# Patient Record
Sex: FEMALE | Race: WHITE
[De-identification: names, ages, dates, MRNs, and addresses within clinical notes are randomized per-mention and may not be internally consistent; named-entity substitution may affect disease eponyms.]

---

## 2020-10-30 ENCOUNTER — HOSPITAL ENCOUNTER (EMERGENCY)
Dept: HOSPITAL 94 - ER | Age: 20
Discharge: HOME | End: 2020-10-30
Payer: MEDICAID

## 2020-10-30 VITALS — WEIGHT: 178.38 LBS | BODY MASS INDEX: 31.61 KG/M2 | HEIGHT: 63 IN

## 2020-10-30 VITALS — DIASTOLIC BLOOD PRESSURE: 87 MMHG | SYSTOLIC BLOOD PRESSURE: 122 MMHG

## 2020-10-30 DIAGNOSIS — K21.9: ICD-10-CM

## 2020-10-30 DIAGNOSIS — L02.611: Primary | ICD-10-CM

## 2020-10-30 DIAGNOSIS — M79.671: ICD-10-CM

## 2020-10-30 DIAGNOSIS — Z88.0: ICD-10-CM

## 2020-10-30 DIAGNOSIS — F12.90: ICD-10-CM

## 2020-10-30 DIAGNOSIS — F17.200: ICD-10-CM

## 2020-10-30 PROCEDURE — 76882 US LMTD JT/FCL EVL NVASC XTR: CPT

## 2020-10-30 PROCEDURE — 10160 PNXR ASPIR ABSC HMTMA BULLA: CPT

## 2020-10-30 PROCEDURE — 10060 I&D ABSCESS SIMPLE/SINGLE: CPT

## 2020-10-30 PROCEDURE — 73610 X-RAY EXAM OF ANKLE: CPT

## 2020-10-30 PROCEDURE — 99284 EMERGENCY DEPT VISIT MOD MDM: CPT

## 2020-12-06 ENCOUNTER — HOSPITAL ENCOUNTER (EMERGENCY)
Dept: HOSPITAL 94 - ER | Age: 20
Discharge: HOME | End: 2020-12-06
Payer: MEDICAID

## 2020-12-06 VITALS — DIASTOLIC BLOOD PRESSURE: 78 MMHG | SYSTOLIC BLOOD PRESSURE: 132 MMHG

## 2020-12-06 VITALS — WEIGHT: 175.38 LBS | HEIGHT: 62 IN | BODY MASS INDEX: 32.27 KG/M2

## 2020-12-06 DIAGNOSIS — Z11.3: Primary | ICD-10-CM

## 2020-12-06 DIAGNOSIS — K21.9: ICD-10-CM

## 2020-12-06 DIAGNOSIS — F12.10: ICD-10-CM

## 2020-12-06 DIAGNOSIS — Z88.0: ICD-10-CM

## 2020-12-06 PROCEDURE — 99281 EMR DPT VST MAYX REQ PHY/QHP: CPT

## 2021-02-16 ENCOUNTER — HOSPITAL ENCOUNTER (EMERGENCY)
Dept: HOSPITAL 94 - ER | Age: 21
Discharge: HOME | End: 2021-02-16
Payer: MEDICAID

## 2021-02-16 VITALS — HEIGHT: 63 IN | BODY MASS INDEX: 31.07 KG/M2 | WEIGHT: 175.38 LBS

## 2021-02-16 VITALS — DIASTOLIC BLOOD PRESSURE: 56 MMHG | SYSTOLIC BLOOD PRESSURE: 148 MMHG

## 2021-02-16 DIAGNOSIS — Z90.49: ICD-10-CM

## 2021-02-16 DIAGNOSIS — Z88.0: ICD-10-CM

## 2021-02-16 DIAGNOSIS — F12.90: ICD-10-CM

## 2021-02-16 DIAGNOSIS — N39.0: ICD-10-CM

## 2021-02-16 DIAGNOSIS — N93.9: Primary | ICD-10-CM

## 2021-02-16 DIAGNOSIS — Z79.2: ICD-10-CM

## 2021-02-16 DIAGNOSIS — K21.9: ICD-10-CM

## 2021-02-16 DIAGNOSIS — Z79.899: ICD-10-CM

## 2021-02-16 LAB
ALBUMIN SERPL BCP-MCNC: 3.9 G/DL (ref 3.4–5)
ALBUMIN/GLOB SERPL: 1.1 {RATIO} (ref 1.1–1.5)
ALP SERPL-CCNC: 49 IU/L (ref 46–116)
ALT SERPL W P-5'-P-CCNC: 16 U/L (ref 12–78)
ANION GAP SERPL CALCULATED.3IONS-SCNC: 7 MMOL/L (ref 8–16)
AST SERPL W P-5'-P-CCNC: 4 U/L (ref 10–37)
BACTERIA URNS QL MICRO: (no result) /HPF
BASOPHILS # BLD AUTO: 0 X10'3 (ref 0–0.2)
BASOPHILS NFR BLD AUTO: 0.5 % (ref 0–1)
BILIRUB SERPL-MCNC: 0.2 MG/DL (ref 0.1–1)
BUN SERPL-MCNC: 14 MG/DL (ref 7–18)
BUN/CREAT SERPL: 19.7 (ref 6.6–38)
CALCIUM SERPL-MCNC: 9 MG/DL (ref 8.5–10.1)
CAOX CRY URNS QL MICRO: (no result) /HPF
CHLORIDE SERPL-SCNC: 105 MMOL/L (ref 99–107)
CLARITY UR: CLEAR
CO2 SERPL-SCNC: 28.6 MMOL/L (ref 24–32)
COLOR UR: YELLOW
CREAT SERPL-MCNC: 0.71 MG/DL (ref 0.4–0.9)
DEPRECATED SQUAMOUS URNS QL MICRO: (no result) /LPF
EOSINOPHIL # BLD AUTO: 0.1 X10'3 (ref 0–0.9)
EOSINOPHIL NFR BLD AUTO: 2.1 % (ref 0–6)
ERYTHROCYTE [DISTWIDTH] IN BLOOD BY AUTOMATED COUNT: 15.3 % (ref 11.5–14.5)
GFR SERPL CREATININE-BSD FRML MDRD: > 90 ML/MIN
GLUCOSE SERPL-MCNC: 103 MG/DL (ref 70–104)
GLUCOSE UR STRIP-MCNC: NEGATIVE MG/DL
HCG UR QL: NEGATIVE
HCT VFR BLD AUTO: 34.7 % (ref 35–45)
HGB BLD-MCNC: 11.3 G/DL (ref 12–16)
HGB UR QL STRIP: (no result)
KETONES UR STRIP-MCNC: NEGATIVE MG/DL
LEUKOCYTE ESTERASE UR QL STRIP: (no result)
LYMPHOCYTES # BLD AUTO: 1.1 X10'3 (ref 1.1–4.8)
LYMPHOCYTES NFR BLD AUTO: 21.3 % (ref 21–51)
MCH RBC QN AUTO: 27 PG (ref 27–31)
MCHC RBC AUTO-ENTMCNC: 32.7 G/DL (ref 33–36.5)
MCV RBC AUTO: 82.5 FL (ref 78–98)
MONOCYTES # BLD AUTO: 0.5 X10'3 (ref 0–0.9)
MONOCYTES NFR BLD AUTO: 10.1 % (ref 2–12)
NEUTROPHILS # BLD AUTO: 3.4 X10'3 (ref 1.8–7.7)
NEUTROPHILS NFR BLD AUTO: 66 % (ref 42–75)
NITRITE UR QL STRIP: NEGATIVE
PH UR STRIP: 5.5 [PH] (ref 4.8–8)
PLATELET # BLD AUTO: 276 X10'3 (ref 140–440)
PMV BLD AUTO: 7 FL (ref 7.4–10.4)
POTASSIUM SERPL-SCNC: 3.8 MMOL/L (ref 3.5–5.1)
PROT SERPL-MCNC: 7.3 G/DL (ref 6.4–8.2)
PROT UR QL STRIP: (no result) MG/DL
RBC # BLD AUTO: 4.21 X10'6 (ref 4.2–5.6)
RBC #/AREA URNS HPF: (no result) /HPF (ref 0–2)
SODIUM SERPL-SCNC: 141 MMOL/L (ref 135–145)
SP GR UR STRIP: >=1.03 (ref 1–1.03)
URN COLLECT METHOD CLASS: (no result)
UROBILINOGEN UR STRIP-MCNC: 0.2 E.U/DL (ref 0.2–1)
WBC # BLD AUTO: 5.2 X10'3 (ref 4.5–11)

## 2021-02-16 PROCEDURE — 87088 URINE BACTERIA CULTURE: CPT

## 2021-02-16 PROCEDURE — 81025 URINE PREGNANCY TEST: CPT

## 2021-02-16 PROCEDURE — 85025 COMPLETE CBC W/AUTO DIFF WBC: CPT

## 2021-02-16 PROCEDURE — 99283 EMERGENCY DEPT VISIT LOW MDM: CPT

## 2021-02-16 PROCEDURE — 81001 URINALYSIS AUTO W/SCOPE: CPT

## 2021-02-16 PROCEDURE — 36415 COLL VENOUS BLD VENIPUNCTURE: CPT

## 2021-02-16 PROCEDURE — 87077 CULTURE AEROBIC IDENTIFY: CPT

## 2021-02-16 PROCEDURE — 80053 COMPREHEN METABOLIC PANEL: CPT

## 2021-02-16 PROCEDURE — 87186 SC STD MICRODIL/AGAR DIL: CPT

## 2021-06-24 ENCOUNTER — HOSPITAL ENCOUNTER (EMERGENCY)
Dept: HOSPITAL 94 - ER | Age: 21
Discharge: LEFT BEFORE DISCHARGE | End: 2021-06-24
Payer: MEDICAID

## 2021-06-24 DIAGNOSIS — Z53.21: ICD-10-CM

## 2021-06-24 DIAGNOSIS — H00.019: Primary | ICD-10-CM

## 2022-01-10 ENCOUNTER — HOSPITAL ENCOUNTER (EMERGENCY)
Dept: HOSPITAL 94 - ER | Age: 22
LOS: 1 days | Discharge: LEFT BEFORE DISCHARGE | End: 2022-01-11
Payer: MEDICAID

## 2022-01-10 VITALS — WEIGHT: 170.35 LBS | BODY MASS INDEX: 32.16 KG/M2 | HEIGHT: 61 IN

## 2022-01-10 VITALS — DIASTOLIC BLOOD PRESSURE: 69 MMHG | SYSTOLIC BLOOD PRESSURE: 130 MMHG

## 2022-01-10 DIAGNOSIS — Z20.822: ICD-10-CM

## 2022-01-10 DIAGNOSIS — Z79.899: ICD-10-CM
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
## 2022-01-10 DIAGNOSIS — H11.422: ICD-10-CM

## 2022-01-10 DIAGNOSIS — T20.10XA: Primary | ICD-10-CM

## 2022-01-10 DIAGNOSIS — K21.9: ICD-10-CM

## 2022-01-10 DIAGNOSIS — Z88.0: ICD-10-CM

## 2022-01-10 DIAGNOSIS — T59.4X1A: ICD-10-CM

## 2022-01-10 DIAGNOSIS — Z90.49: ICD-10-CM

## 2022-01-10 PROCEDURE — 85025 COMPLETE CBC W/AUTO DIFF WBC: CPT

## 2022-01-10 PROCEDURE — 36415 COLL VENOUS BLD VENIPUNCTURE: CPT

## 2022-01-10 PROCEDURE — 80048 BASIC METABOLIC PNL TOTAL CA: CPT

## 2022-01-10 PROCEDURE — 87635 SARS-COV-2 COVID-19 AMP PRB: CPT

## 2022-01-10 PROCEDURE — 99283 EMERGENCY DEPT VISIT LOW MDM: CPT

## 2022-01-11 LAB
ALBUMIN SERPL BCP-MCNC: 3.8 G/DL (ref 3.4–5)
ANION GAP SERPL CALCULATED.3IONS-SCNC: 7 MMOL/L (ref 8–16)
BASOPHILS # BLD AUTO: 0 X10'3 (ref 0–0.2)
BASOPHILS NFR BLD AUTO: 0.5 % (ref 0–1)
BUN SERPL-MCNC: 8 MG/DL (ref 7–18)
BUN/CREAT SERPL: 13.8 (ref 6.6–38)
CALCIUM SERPL-MCNC: 9 MG/DL (ref 8.5–10.1)
CHLORIDE SERPL-SCNC: 102 MMOL/L (ref 99–107)
CO2 SERPL-SCNC: 28.6 MMOL/L (ref 24–32)
CREAT SERPL-MCNC: 0.58 MG/DL (ref 0.4–0.9)
EOSINOPHIL # BLD AUTO: 0 X10'3 (ref 0–0.9)
EOSINOPHIL NFR BLD AUTO: 0.5 % (ref 0–6)
ERYTHROCYTE [DISTWIDTH] IN BLOOD BY AUTOMATED COUNT: 12.9 % (ref 11.5–14.5)
GFR SERPL CREATININE-BSD FRML MDRD: > 90 ML/MIN
GLUCOSE SERPL-MCNC: 96 MG/DL (ref 70–104)
HCT VFR BLD AUTO: 38.2 % (ref 35–45)
HGB BLD-MCNC: 13.1 G/DL (ref 12–16)
LYMPHOCYTES # BLD AUTO: 1.7 X10'3 (ref 1.1–4.8)
LYMPHOCYTES NFR BLD AUTO: 18.6 % (ref 21–51)
MCH RBC QN AUTO: 29.7 PG (ref 27–31)
MCHC RBC AUTO-ENTMCNC: 34.2 G/DL (ref 33–36.5)
MCV RBC AUTO: 86.9 FL (ref 78–98)
MONOCYTES # BLD AUTO: 0.5 X10'3 (ref 0–0.9)
MONOCYTES NFR BLD AUTO: 5.7 % (ref 2–12)
NEUTROPHILS # BLD AUTO: 6.7 X10'3 (ref 1.8–7.7)
NEUTROPHILS NFR BLD AUTO: 74.7 % (ref 42–75)
PLATELET # BLD AUTO: 260 X10'3 (ref 140–440)
PMV BLD AUTO: 7.3 FL (ref 7.4–10.4)
POTASSIUM SERPL-SCNC: 4 MMOL/L (ref 3.5–5.1)
RBC # BLD AUTO: 4.4 X10'6 (ref 4.2–5.6)
SODIUM SERPL-SCNC: 138 MMOL/L (ref 135–145)
WBC # BLD AUTO: 9 X10'3 (ref 4.5–11)
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
## 2022-02-21 ENCOUNTER — HOSPITAL ENCOUNTER (EMERGENCY)
Dept: HOSPITAL 94 - ER | Age: 22
Discharge: LEFT BEFORE DISCHARGE | End: 2022-02-21
Payer: MEDICAID

## 2022-02-21 VITALS — SYSTOLIC BLOOD PRESSURE: 118 MMHG | DIASTOLIC BLOOD PRESSURE: 73 MMHG

## 2022-02-21 VITALS — HEIGHT: 63 IN | BODY MASS INDEX: 32.85 KG/M2 | WEIGHT: 185.39 LBS

## 2022-02-21 DIAGNOSIS — Z53.21: ICD-10-CM

## 2022-02-21 DIAGNOSIS — N93.9: Primary | ICD-10-CM

## 2022-09-08 ENCOUNTER — HOSPITAL ENCOUNTER (EMERGENCY)
Dept: HOSPITAL 94 - ER | Age: 22
Discharge: LEFT BEFORE DISCHARGE | End: 2022-09-08
Payer: SELF-PAY

## 2022-09-08 VITALS — WEIGHT: 185.19 LBS | HEIGHT: 63 IN | BODY MASS INDEX: 32.81 KG/M2

## 2022-09-08 VITALS — SYSTOLIC BLOOD PRESSURE: 118 MMHG | DIASTOLIC BLOOD PRESSURE: 69 MMHG

## 2022-09-08 DIAGNOSIS — Z53.21: ICD-10-CM

## 2022-09-08 DIAGNOSIS — M79.605: Primary | ICD-10-CM

## 2022-12-25 ENCOUNTER — HOSPITAL ENCOUNTER (EMERGENCY)
Dept: HOSPITAL 94 - ER | Age: 22
Discharge: LEFT BEFORE DISCHARGE | End: 2022-12-25
Payer: MEDICAID

## 2022-12-25 VITALS — HEIGHT: 62 IN | BODY MASS INDEX: 31.24 KG/M2 | WEIGHT: 169.76 LBS

## 2022-12-25 VITALS — SYSTOLIC BLOOD PRESSURE: 105 MMHG | DIASTOLIC BLOOD PRESSURE: 68 MMHG

## 2022-12-25 DIAGNOSIS — Z53.21: ICD-10-CM

## 2022-12-25 DIAGNOSIS — K04.7: Primary | ICD-10-CM
